# Patient Record
Sex: MALE | Race: WHITE
[De-identification: names, ages, dates, MRNs, and addresses within clinical notes are randomized per-mention and may not be internally consistent; named-entity substitution may affect disease eponyms.]

---

## 2017-01-18 ENCOUNTER — HOSPITAL ENCOUNTER (EMERGENCY)
Dept: HOSPITAL 17 - NETRI | Age: 62
Discharge: HOME | End: 2017-01-18
Payer: SELF-PAY

## 2017-01-18 VITALS
RESPIRATION RATE: 20 BRPM | DIASTOLIC BLOOD PRESSURE: 84 MMHG | HEART RATE: 54 BPM | SYSTOLIC BLOOD PRESSURE: 131 MMHG | TEMPERATURE: 97.7 F | OXYGEN SATURATION: 95 %

## 2017-01-18 VITALS — BODY MASS INDEX: 21.08 KG/M2 | WEIGHT: 142.31 LBS | HEIGHT: 69 IN

## 2017-01-18 DIAGNOSIS — I10: ICD-10-CM

## 2017-01-18 DIAGNOSIS — J45.998: ICD-10-CM

## 2017-01-18 DIAGNOSIS — G47.30: ICD-10-CM

## 2017-01-18 DIAGNOSIS — Z59.0: ICD-10-CM

## 2017-01-18 DIAGNOSIS — J44.1: Primary | ICD-10-CM

## 2017-01-18 LAB
ANION GAP SERPL CALC-SCNC: 6 MEQ/L (ref 5–15)
APTT BLD: 27.5 SEC (ref 24.3–30.1)
BASOPHILS # BLD AUTO: 0 TH/MM3 (ref 0–0.2)
BASOPHILS NFR BLD: 0.5 % (ref 0–2)
BUN SERPL-MCNC: 8 MG/DL (ref 7–18)
CHLORIDE SERPL-SCNC: 105 MEQ/L (ref 98–107)
CK SERPL-CCNC: 56 U/L (ref 39–308)
EOSINOPHIL # BLD: 0.1 TH/MM3 (ref 0–0.4)
EOSINOPHIL NFR BLD: 2.1 % (ref 0–4)
ERYTHROCYTE [DISTWIDTH] IN BLOOD BY AUTOMATED COUNT: 15.2 % (ref 11.6–17.2)
GFR SERPLBLD BASED ON 1.73 SQ M-ARVRAT: 84 ML/MIN (ref 89–?)
HCO3 BLD-SCNC: 27.2 MEQ/L (ref 21–32)
HCT VFR BLD CALC: 41.3 % (ref 39–51)
HEMO FLAGS: (no result)
INR PPP: 1 RATIO
LYMPHOCYTES # BLD AUTO: 2 TH/MM3 (ref 1–4.8)
LYMPHOCYTES NFR BLD AUTO: 40.6 % (ref 9–44)
MCH RBC QN AUTO: 29.6 PG (ref 27–34)
MCHC RBC AUTO-ENTMCNC: 33.4 % (ref 32–36)
MCV RBC AUTO: 88.5 FL (ref 80–100)
MONOCYTES NFR BLD: 8.8 % (ref 0–8)
NEUTROPHILS # BLD AUTO: 2.4 TH/MM3 (ref 1.8–7.7)
NEUTROPHILS NFR BLD AUTO: 48 % (ref 16–70)
PLATELET # BLD: 226 TH/MM3 (ref 150–450)
POTASSIUM SERPL-SCNC: 3.7 MEQ/L (ref 3.5–5.1)
PROTHROMBIN TIME: 10.5 SEC (ref 9.8–11.6)
RBC # BLD AUTO: 4.67 MIL/MM3 (ref 4.5–5.9)
SODIUM SERPL-SCNC: 138 MEQ/L (ref 136–145)
WBC # BLD AUTO: 5 TH/MM3 (ref 4–11)

## 2017-01-18 PROCEDURE — 71010: CPT

## 2017-01-18 PROCEDURE — 84484 ASSAY OF TROPONIN QUANT: CPT

## 2017-01-18 PROCEDURE — 85025 COMPLETE CBC W/AUTO DIFF WBC: CPT

## 2017-01-18 PROCEDURE — 85610 PROTHROMBIN TIME: CPT

## 2017-01-18 PROCEDURE — 85730 THROMBOPLASTIN TIME PARTIAL: CPT

## 2017-01-18 PROCEDURE — 80048 BASIC METABOLIC PNL TOTAL CA: CPT

## 2017-01-18 PROCEDURE — 93005 ELECTROCARDIOGRAM TRACING: CPT

## 2017-01-18 PROCEDURE — 82550 ASSAY OF CK (CPK): CPT

## 2017-01-18 SDOH — ECONOMIC STABILITY - HOUSING INSECURITY: HOMELESSNESS: Z59.0

## 2017-01-18 NOTE — PD
Physical Exam


Time Seen by Provider:  11:22


Narrative


61 year old male presents to the ED for evaluation of chest tightness that 

began last week when he had a cold. It has been lingering; not getting better, 

not getting worse.  States he wakes up and feels short of breath. No fever or 

chills. Intermittent cough productive of a little greenish yellow.  No history 

of MI. Has hx of COPD, sleep apnea, HTN. Pt is homeless. No other symptoms to 

report. .





Data


Data


Last Documented VS





Vital Signs








  Date Time  Temp Pulse Resp B/P Pulse Ox O2 Delivery O2 Flow Rate FiO2


 


1/18/17 11:18 97.7 54 20 131/84 95 Room Air  








Orders





 Complete Blood Count With Diff (1/18/17 11:24)


Basic Metabolic Panel (Bmp) (1/18/17 11:24)


Act Partial Throm Time (Ptt) (1/18/17 11:24)


Prothrombin Time / Inr (Pt) (1/18/17 11:24)


Ckmb (Isoenzyme) Profile (1/18/17 11:24)


Troponin I (1/18/17 11:24)


Electrocardiogram (1/18/17 11:24)


Chest, Single Ap (1/18/17 )





Labs





 Laboratory Tests








Test 1/18/17





 11:35


 


White Blood Count 5.0 TH/MM3


 


Red Blood Count 4.67 MIL/MM3


 


Hemoglobin 13.8 GM/DL


 


Hematocrit 41.3 %


 


Mean Corpuscular Volume 88.5 FL


 


Mean Corpuscular Hemoglobin 29.6 PG


 


Mean Corpuscular Hemoglobin 33.4 %





Concent 


 


Red Cell Distribution Width 15.2 %


 


Platelet Count 226 TH/MM3


 


Mean Platelet Volume 8.5 FL


 


Neutrophils (%) (Auto) 48.0 %


 


Lymphocytes (%) (Auto) 40.6 %


 


Monocytes (%) (Auto) 8.8 %


 


Eosinophils (%) (Auto) 2.1 %


 


Basophils (%) (Auto) 0.5 %


 


Neutrophils # (Auto) 2.4 TH/MM3


 


Lymphocytes # (Auto) 2.0 TH/MM3


 


Monocytes # (Auto) 0.4 TH/MM3


 


Eosinophils # (Auto) 0.1 TH/MM3


 


Basophils # (Auto) 0.0 TH/MM3


 


CBC Comment DIFF FINAL 


 


Differential Comment  


 


Prothrombin Time 10.5 SEC


 


Prothromb Time International 1.0 RATIO





Ratio 


 


Activated Partial 27.5 SEC





Thromboplast Time 


 


Sodium Level 138 MEQ/L


 


Potassium Level 3.7 MEQ/L


 


Chloride Level 105 MEQ/L


 


Carbon Dioxide Level 27.2 MEQ/L


 


Anion Gap 6 MEQ/L


 


Blood Urea Nitrogen 8 MG/DL


 


Creatinine 0.91 MG/DL


 


Estimat Glomerular Filtration 84 ML/MIN





Rate 


 


Random Glucose 131 MG/DL


 


Calcium Level 9.3 MG/DL


 


Total Creatine Kinase 56 U/L


 


Troponin I LESS THAN 0.02





 NG/ML











MDM


Medical Record Reviewed:  Yes


Supervised Visit with JIGAR:  No


Narrative Course


Work up initiated in triage. Pt is aware if he leaves prior to disposition it 

would be against medical advice.  This could result in the following, not 

limited to: worsening of his symptoms, sepsis, or death





1308 Called pt to discuss results and disposition; he is not in waiting area. 

He has left AMA


Diagnosis





 Primary Impression:  


 COPD exacerbation


Referrals:  


Primary Care Physician


Patient Instructions:  COPD (Chronic Obstructive Pulmonary Disease) (ED), 

General Instructions





***Additional Instruction:


Stop smoking


Follow up with your primary care provider


Return immediately with any acute worsening of symptoms


***Med/Other Pt SpecificInfo:  Prescription(s) given


Scripts


Prednisone 50 Mg Tab50 Mg PO DAILY  5 Days  Ref 0


   Prov:Sarah Brito         1/18/17 


Albuterol 8.5 GM Inh (Proair Hfa 8.5 GM Inh)90 Mcg/Act Aer2 Puff INH Q4H PRN (

SHORTNESS OF BREATH) #1 INHALER  Ref 0


   108 mcg/actuation


   Prov:Sarah Brito         1/18/17 


Azithromycin (Zithromax Z-José)250 Mg [id number] Mg PO AS DIRECTED  #1 DSPK  Ref 0


   500 MG (2 tabs) day 1, then 1 tab days 2-5.


   Prov:Sarah Brito         1/18/17


Disposition:  01 DISCHARGE HOME


Condition:  Stable








Sarah Brito Jan 18, 2017 11:24

## 2017-01-18 NOTE — RADRPT
EXAM DATE/TIME:  01/18/2017 11:47 

 

[facility] COMPARISON:     

CHEST SINGLE AP, April 23, 2016, 7:28.

 

                     

INDICATIONS :     

Short of breath.

                     

 

MEDICAL HISTORY :     

None.          

 

SURGICAL HISTORY :     

None.   

 

ENCOUNTER:     

Initial                                        

 

ACUITY:     

1 week      

 

PAIN SCORE:     

1/10

 

LOCATION:     

Right chest 

 

FINDINGS:     

A single view of the chest demonstrates the lungs to be symmetrically aerated without evidence of mas
s, infiltrate or effusion. The lungs appear to be chronically hyperinflated. The cardiomediastinal co
ntours are unremarkable.  Osseous structures are intact.

 

CONCLUSION:     Findings consistent with COPD. No acute abnormality. 

 

 

 Aurora Banerjee MD on January 18, 2017 at 12:15           

Board Certified Radiologist.

 This report was verified electronically.

## 2017-01-19 NOTE — EKG
Date Performed: 01/18/2017       Time Performed: 11:50:03

 

PTAGE:      61 years

 

EKG:      Sinus rhythm 

 

 INDETERMINATE AXIS ABNORMAL ECG

 

PREVIOUS TRACING       : 04/24/2016 20.14

 

DOCTOR:   Reji Garza  Interpretating Date/Time  01/19/2017 21:12:49

## 2017-04-23 ENCOUNTER — HOSPITAL ENCOUNTER (EMERGENCY)
Dept: HOSPITAL 17 - NEPK | Age: 62
Discharge: HOME | End: 2017-04-23
Payer: MEDICAID

## 2017-04-23 VITALS — HEIGHT: 69 IN | BODY MASS INDEX: 20.9 KG/M2 | WEIGHT: 141.1 LBS

## 2017-04-23 VITALS
HEART RATE: 60 BPM | RESPIRATION RATE: 16 BRPM | DIASTOLIC BLOOD PRESSURE: 94 MMHG | SYSTOLIC BLOOD PRESSURE: 148 MMHG | TEMPERATURE: 97 F | OXYGEN SATURATION: 97 %

## 2017-04-23 DIAGNOSIS — Z72.0: ICD-10-CM
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
## 2017-04-23 DIAGNOSIS — S69.91XA: Primary | ICD-10-CM

## 2017-04-23 DIAGNOSIS — F12.90: ICD-10-CM

## 2017-04-23 DIAGNOSIS — I10: ICD-10-CM

## 2017-04-23 PROCEDURE — 99283 EMERGENCY DEPT VISIT LOW MDM: CPT

## 2017-04-23 PROCEDURE — 73130 X-RAY EXAM OF HAND: CPT

## 2017-04-23 NOTE — RADRPT
EXAM DATE/TIME:  04/23/2017 12:52 

 

[facility] COMPARISON:     

No previous studies available for comparison.

 

                     

INDICATIONS :     

Right hand pain that radiates from first and second digits.

                     

 

MEDICAL HISTORY :     

None.          

 

SURGICAL HISTORY :     

None.   

 

ENCOUNTER:     

Initial                                        

 

ACUITY:     

4 - 6 days      

 

PAIN SCORE:     

10/10

 

LOCATION:     

Right hand

 

FINDINGS:     

No acute fracture is seen.  There is very prominent hypertrophic change seen at the first carpometaca
rpal joint.  There is remodeling at the trapezium.  There is prominent hypertrophic seen around the b
ase of the first metacarpal.  There is some joint space narrowing at this region.  The remaining bone
s and joints appear normally aligned.  

 

CONCLUSION:     

 

Very prominent degenerative change at the first carpometacarpal joint. 

 

 Asad Moore MD on April 23, 2017 at 13:18                

Board Certified Radiologist.

 This report was verified electronically.

## 2017-04-23 NOTE — PD
HPI


Chief Complaint:  Injury


Time Seen by Provider:  12:29


Travel History


International Travel<30 days:  No


Contact w/Intl Traveler<30days:  No


Traveled to known affect area:  No





History of Present Illness


HPI


61-year-old male presents to emergency Department with complaint of right hand 

pain since Wednesday.  He laid down his scooter and injured his hand.  He 

denies hitting his head or loss of consciousness.  Denies neck pain or back 

pain.  Denies chest pain, shortness breath, abdominal pain, nausea, vomiting.  

Denies anticoagulants.  Denies other extremity pain.  Denies paresthesias, loss 

of sensation to the affected extremity.  Reports decreased range of motion 

secondary to pain and swelling.  Patient is taking tramadol and Percocet for 

pain with good relief.  He took 2 tramadol this morning prior to arrival to the 

ER.  No other medical complaints.  No other modifying factors or associated 

signs and symptoms.





PFSH


Past Medical History


Arthritis:  Yes


Asthma:  Yes


Blood Disorders:  No


Anxiety:  Yes


Depression:  Yes


Heart Rhythm Problems:  No


Cancer:  No


Cardiovascular Problems:  No


High Cholesterol:  Yes


Chest Pain:  Yes


Congestive Heart Failure:  No


COPD:  Yes


Cerebrovascular Accident:  No


Diabetes:  No


Endocrine:  No


Gastrointestinal Disorders:  Yes (POLYPS REMOVED, HEPATITIS)


Genitourinary:  No


Headaches:  Yes


Hypertension:  Yes


Immune Disorder:  No


Implanted Vascular Access Dvce:  No


Musculoskeletal:  Yes


Neurologic:  Yes


Psychiatric:  No


Reproductive:  No


Respiratory:  Yes (COPD)


Migraines:  Yes


Myocardial Infarction:  No


Sleep Apnea:  Yes


Thyroid Disease:  No





Past Surgical History


Other Surgery:  Yes (SCOPE RIGHT KNEE)





Family History


Family Hypercholesterolemia:  Yes





Social History


Alcohol Use:  No (NONE 2/09.)


Tobacco Use:  Yes (4 CIGS A DAY )


Substance Use:  Yes ( WEED )





Allergies-Medications


(Allergen,Severity, Reaction):  


Coded Allergies:  


     *MDRO Multi-Drug Resistant Organism (Verified  Adverse Reaction, Unknown, 4 /23/17)


 MRSA PCR (nares) positive - 4/23/16


Reported Meds & Prescriptions





Reported Meds & Active Scripts


Active


Ibuprofen 800 Mg Tab 800 Mg PO Q8HR PRN








Review of Systems


Except as stated in HPI:  all other systems reviewed are Neg





Physical Exam


Narrative


GENERAL: Well-nourished, well-developed  male patient, in no acute 

distress


SKIN: Warm and dry.


HEAD: Atraumatic. Normocephalic. 


EYES: Pupils equal and round. No scleral icterus. No injection or drainage.


ENT: Mucosa pink and moist.  Airway patent.  


NECK: Trachea midline.  


CARDIOVASCULAR: Regular rate.  


RESPIRATORY: No accessory muscle use.


GASTROINTESTINAL: Flat.


MUSCULOSKELETAL: Right hand is mildly edematous at the first and second 

metacarpal area and with tenderness on palpation; no obvious deformity; without 

erythema, edema, ecchymosis; fingers with full range of motion and sensory 

intact.  Rate approximately supple and non-tense with 2+ radial pulse and 

sensory intact.  No obvious deformities. No clubbing.  No cyanosis. 


NEUROLOGICAL: Awake and alert.  Oriented 3.  No obvious cranial nerve 

deficits.  Motor grossly within normal limits. Normal speech. 


PSYCHIATRIC: Appropriate mood and affect; insight and judgment normal.





Data


Data


Last Documented VS





Vital Signs








  Date Time  Temp Pulse Resp B/P Pulse Ox O2 Delivery O2 Flow Rate FiO2


 


4/23/17 12:23 97.0 60 16 148/94 97 Room Air  








Orders





 Hand, Complete (May3qbv) (4/23/17 12:34)


Ice/Cold Pack (4/23/17 12:34)


Splint Or Brace Apply/Monitor (4/23/17 13:58)








MDM


Medical Decision Making


Medical Screen Exam Complete:  Yes


Emergency Medical Condition:  Yes


Medical Record Reviewed:  Yes


Differential Diagnosis


Hand fracture, hand sprain, hand contusion


Narrative Course


61-year-old male with right hand injury after laying down his scooter on 

Wednesday.  He denies hitting his head or loss of consciousness.  Denies neck 

pain or back pain.  I offered the patient a nonnarcotic for pain and he 

declined.  Right hand x-ray ordered.


1359:  Right hand x-ray with no acute findings.  A copy of the x-ray report was 

provided for the patient for degenerative findings.  Ace bandage applied for 

compression and support.  Ibuprofen prescribed for home.  Patient verbalizes 

understanding and agreement with treatment plan.  Patient is medically cleared 

and stable for discharge.  Discussed reasons to return to the emergency 

department.  Instructed patient to follow up with primary care provider.  

Patient agrees with treatment plan.  The patients vital signs are stable and 

the patient is stable for outpatient follow-up and treatment.  Patient 

discharged home, stable and in no acute distress.





Diagnosis





 Primary Impression:  


 Injury of right hand


 Qualified Code:  S69.91XA - Injury of right hand, initial encounter


Referrals:  


Primary Care Physician


Patient Instructions:  General Instructions, Hand Sprain (ED)


Departure Forms:  Tests/Procedures, Work Release   Enter return to work date:  

May 1, 2017





***Additional Instructions:


Tylenol or ibuprofen as directed and as needed to reduce pain


Rest, ice, compress, and elevate extremity to decrease pain and inflammation


Ace wrap for compression and support


Avoid aggravating activity; increase activity as tolerated


Follow-up with primary care provider


Return to the emergency department immediately with worsening symptoms


***Med/Other Pt SpecificInfo:  Prescription(s) given


Scripts


Ibuprofen 800 Mg Qxi854 Mg PO Q8HR PRN (PAIN) #30 TAB  Ref 0


   Prov:Zeenat Pineda         4/23/17


Disposition:  01 DISCHARGE HOME


Condition:  Stable








Zeenat Pineda Apr 23, 2017 12:29

## 2017-08-18 ENCOUNTER — HOSPITAL ENCOUNTER (EMERGENCY)
Dept: HOSPITAL 17 - NED | Age: 62
Discharge: LEFT BEFORE DISCHARGE | End: 2017-08-18
Payer: SELF-PAY

## 2017-08-18 VITALS — BODY MASS INDEX: 20.16 KG/M2 | WEIGHT: 136.14 LBS | HEIGHT: 69 IN

## 2017-08-18 VITALS
HEART RATE: 89 BPM | TEMPERATURE: 98.2 F | RESPIRATION RATE: 16 BRPM | OXYGEN SATURATION: 99 % | SYSTOLIC BLOOD PRESSURE: 159 MMHG | DIASTOLIC BLOOD PRESSURE: 90 MMHG

## 2017-08-18 DIAGNOSIS — L98.9: Primary | ICD-10-CM

## 2017-08-18 PROCEDURE — 99281 EMR DPT VST MAYX REQ PHY/QHP: CPT

## 2017-08-19 ENCOUNTER — HOSPITAL ENCOUNTER (EMERGENCY)
Dept: HOSPITAL 17 - NEPD | Age: 62
Discharge: LEFT BEFORE DISCHARGE | End: 2017-08-19
Payer: SELF-PAY

## 2017-08-19 VITALS
TEMPERATURE: 98.6 F | SYSTOLIC BLOOD PRESSURE: 155 MMHG | OXYGEN SATURATION: 98 % | RESPIRATION RATE: 15 BRPM | DIASTOLIC BLOOD PRESSURE: 113 MMHG | HEART RATE: 101 BPM

## 2017-08-19 VITALS — WEIGHT: 132.28 LBS | HEIGHT: 69 IN | BODY MASS INDEX: 19.59 KG/M2

## 2017-08-19 DIAGNOSIS — S80.811A: ICD-10-CM

## 2017-08-19 DIAGNOSIS — S80.812A: Primary | ICD-10-CM
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
## 2017-08-19 PROCEDURE — 99281 EMR DPT VST MAYX REQ PHY/QHP: CPT

## 2017-08-19 NOTE — PD
HPI


.


skin wounds for 3 weeks


Chief Complaint:  Laceration/Skin Injury


Time Seen by Provider:  12:47


Travel History


International Travel<30 days:  No


Contact w/Intl Traveler<30days:  No


Traveled to known affect area:  No





History of Present Illness


HPI


62-year-old male here with complaints of skin wounds that he's had for 3 weeks.

  Patient says that one time they were draining pus.  He would like to have 

them checked out.  He is concerned about staph.  He denies any fever or chills.

  He has no other complaints.





PFSH


Past Medical History


Arthritis:  Yes


Asthma:  Yes


Blood Disorders:  No


Bipolar Disorder:  Yes


Anxiety:  Yes


Depression:  Yes


Heart Rhythm Problems:  No


Cancer:  No


Cardiovascular Problems:  No


High Cholesterol:  Yes


Chest Pain:  Yes


Congestive Heart Failure:  No


COPD:  Yes


Cerebrovascular Accident:  No


Diabetes:  No


Endocrine:  No


Gastrointestinal Disorders:  Yes (POLYPS REMOVED, HEPATITIS)


Genitourinary:  No


Headaches:  Yes


Hepatitis:  Yes


Hypertension:  Yes


Immune Disorder:  No


Implanted Vascular Access Dvce:  No


Musculoskeletal:  Yes


Neurologic:  Yes


Psychiatric:  No


Reproductive:  No


Respiratory:  Yes (COPD)


Migraines:  Yes


Myocardial Infarction:  No


Sleep Apnea:  Yes


Thyroid Disease:  No





Past Surgical History


Other Surgery:  Yes (SCOPE RIGHT KNEE)





Family History


Family Hypercholesterolemia:  Yes





Social History


Alcohol Use:  No


Tobacco Use:  Yes (4 CIGS A DAY )


Substance Use:  Yes ( WEED )





Allergies-Medications


(Allergen,Severity, Reaction):  


Coded Allergies:  


     *MDRO Multi-Drug Resistant Organism (Verified  Adverse Reaction, Unknown, 4 /23/17)


 MRSA PCR (nares) positive - 4/23/16


Reported Meds & Prescriptions





Reported Meds & Active Scripts


Active


Ibuprofen 800 Mg Tab 800 Mg PO Q8HR PRN








Review of Systems


General / Constitutional:  No: Fever


Eyes:  No: Visual changes


HENT:  No: Headaches


Cardiovascular:  No: Chest Pain or Discomfort


Respiratory:  No: Shortness of Breath


Gastrointestinal:  No: Abdominal Pain


Genitourinary:  No: Dysuria


Musculoskeletal:  No: Pain


Skin:  Positive Other (skin excoriation), No Rash


Neurologic:  No: Weakness


Psychiatric:  No: Depression


Endocrine:  No: Polydipsia


Hematologic/Lymphatic:  No: Easy Bruising





Physical Exam


Narrative


GENERAL: AAO x 3, no acute distress, Well-nourished, well-developed patient.


SKIN: Warm and dry. No visible rashes or bruising. + excoriation to the left 

and right legs, without evidence of infection, no purulence, all scabbed over 


HEAD: Normocephalic and atraumatic.


EYES: No scleral icterus. No injection or drainage. 


ENT: No nasal drainage noted. Mucous membranes pink. Airway patent. 


NECK: Supple, trachea midline. No JVD.


CARDIOVASCULAR: Regular rate and rhythm without murmurs, gallops, or rubs. 


RESPIRATORY: Breath sounds equal bilaterally. No accessory muscle use. No 

rhonchi or rales. 


GASTROINTESTINAL: Abdomen soft, non-tender, nondistended. 


EXTREMITIES: No cyanosis or edema. 


BACK: No obvious deformity. 


NEURO: CN II-12 intact,  strength normal b/l, UE and LE 5/5, no focal 

deficits


PSYCH: AAO x 3, normal affect.





Data


Data


Last Documented VS





Vital Signs








  Date Time  Temp Pulse Resp B/P Pulse Ox O2 Delivery O2 Flow Rate FiO2


 


8/19/17 12:19 98.6 101 15 155/113 98   











MDM


Medical Decision Making


Medical Screen Exam Complete:  Yes


Emergency Medical Condition:  No


Medical Record Reviewed:  Yes


Differential Diagnosis


excoriations, less likely cellulitis, less likely non healing ulcer


Narrative Course


A medical screening exam was performed: 


At the time of evaluation the presenting medical condition was determined not 

to be of an emergent nature. 


The patient was given the option of receiving additional care, but declined. 


Patient was given options for additional community resources from which to 

obtain care.


The Patient Has Been advised to seek medical attention for their presenting 

complaint.


The patient has been advised to return to the ER at any time if an emergent 

condition develops.





NO evidence of infection. 


I advised patient to f/u with his PCP for bp.





Diagnosis





 Primary Impression:  


 Encounter for medical screening examination


Condition:  Stable








Fallon Mendoza Aug 19, 2017 12:47

## 2017-12-12 ENCOUNTER — HOSPITAL ENCOUNTER (EMERGENCY)
Dept: HOSPITAL 17 - NEPD | Age: 62
Discharge: HOME | End: 2017-12-12
Payer: SELF-PAY

## 2017-12-12 VITALS — BODY MASS INDEX: 22.39 KG/M2 | HEIGHT: 68 IN | WEIGHT: 147.71 LBS

## 2017-12-12 VITALS
OXYGEN SATURATION: 99 % | HEART RATE: 96 BPM | TEMPERATURE: 98.2 F | SYSTOLIC BLOOD PRESSURE: 168 MMHG | RESPIRATION RATE: 16 BRPM | DIASTOLIC BLOOD PRESSURE: 98 MMHG

## 2017-12-12 VITALS
DIASTOLIC BLOOD PRESSURE: 97 MMHG | HEART RATE: 99 BPM | SYSTOLIC BLOOD PRESSURE: 160 MMHG | RESPIRATION RATE: 18 BRPM | TEMPERATURE: 100.3 F | OXYGEN SATURATION: 96 %

## 2017-12-12 DIAGNOSIS — R10.9: ICD-10-CM

## 2017-12-12 DIAGNOSIS — J44.9: ICD-10-CM

## 2017-12-12 DIAGNOSIS — E78.00: ICD-10-CM

## 2017-12-12 DIAGNOSIS — F31.9: ICD-10-CM

## 2017-12-12 DIAGNOSIS — I10: ICD-10-CM

## 2017-12-12 DIAGNOSIS — R74.0: Primary | ICD-10-CM

## 2017-12-12 DIAGNOSIS — Z16.24: ICD-10-CM

## 2017-12-12 DIAGNOSIS — F17.210: ICD-10-CM

## 2017-12-12 DIAGNOSIS — Z86.14: ICD-10-CM

## 2017-12-12 LAB
ALP SERPL-CCNC: 98 U/L (ref 45–117)
ALT SERPL-CCNC: 147 U/L (ref 12–78)
ANION GAP SERPL CALC-SCNC: 4 MEQ/L (ref 5–15)
AST SERPL-CCNC: 152 U/L (ref 15–37)
BASOPHILS # BLD AUTO: 0 TH/MM3 (ref 0–0.2)
BASOPHILS NFR BLD: 0.5 % (ref 0–2)
BILIRUB SERPL-MCNC: 0.5 MG/DL (ref 0.2–1)
BUN SERPL-MCNC: 13 MG/DL (ref 7–18)
CHLORIDE SERPL-SCNC: 103 MEQ/L (ref 98–107)
EOSINOPHIL # BLD: 0.3 TH/MM3 (ref 0–0.4)
EOSINOPHIL NFR BLD: 6.3 % (ref 0–4)
ERYTHROCYTE [DISTWIDTH] IN BLOOD BY AUTOMATED COUNT: 14.6 % (ref 11.6–17.2)
GFR SERPLBLD BASED ON 1.73 SQ M-ARVRAT: 76 ML/MIN (ref 89–?)
HCO3 BLD-SCNC: 28.8 MEQ/L (ref 21–32)
HCT VFR BLD CALC: 42.2 % (ref 39–51)
HEMO FLAGS: (no result)
LYMPHOCYTES # BLD AUTO: 0.4 TH/MM3 (ref 1–4.8)
LYMPHOCYTES NFR BLD AUTO: 8.9 % (ref 9–44)
MCH RBC QN AUTO: 31 PG (ref 27–34)
MCHC RBC AUTO-ENTMCNC: 34.5 % (ref 32–36)
MCV RBC AUTO: 89.7 FL (ref 80–100)
MONOCYTES NFR BLD: 12 % (ref 0–8)
NEUTROPHILS # BLD AUTO: 3.2 TH/MM3 (ref 1.8–7.7)
NEUTROPHILS NFR BLD AUTO: 72.3 % (ref 16–70)
PLATELET # BLD: 171 TH/MM3 (ref 150–450)
POTASSIUM SERPL-SCNC: 3.9 MEQ/L (ref 3.5–5.1)
RBC # BLD AUTO: 4.7 MIL/MM3 (ref 4.5–5.9)
SODIUM SERPL-SCNC: 136 MEQ/L (ref 136–145)
WBC # BLD AUTO: 4.4 TH/MM3 (ref 4–11)

## 2017-12-12 PROCEDURE — 99284 EMERGENCY DEPT VISIT MOD MDM: CPT

## 2017-12-12 PROCEDURE — 96372 THER/PROPH/DIAG INJ SC/IM: CPT

## 2017-12-12 PROCEDURE — 96374 THER/PROPH/DIAG INJ IV PUSH: CPT

## 2017-12-12 PROCEDURE — 80053 COMPREHEN METABOLIC PANEL: CPT

## 2017-12-12 PROCEDURE — 83690 ASSAY OF LIPASE: CPT

## 2017-12-12 PROCEDURE — 85025 COMPLETE CBC W/AUTO DIFF WBC: CPT

## 2018-04-16 ENCOUNTER — HOSPITAL ENCOUNTER (EMERGENCY)
Dept: HOSPITAL 17 - NEPC | Age: 63
Discharge: HOME | End: 2018-04-16
Payer: MEDICAID

## 2018-04-16 VITALS — BODY MASS INDEX: 19.59 KG/M2 | HEIGHT: 69 IN | WEIGHT: 132.28 LBS

## 2018-04-16 VITALS
SYSTOLIC BLOOD PRESSURE: 144 MMHG | DIASTOLIC BLOOD PRESSURE: 88 MMHG | RESPIRATION RATE: 18 BRPM | HEART RATE: 64 BPM | OXYGEN SATURATION: 97 %

## 2018-04-16 VITALS — OXYGEN SATURATION: 97 %

## 2018-04-16 VITALS
SYSTOLIC BLOOD PRESSURE: 181 MMHG | OXYGEN SATURATION: 96 % | RESPIRATION RATE: 18 BRPM | DIASTOLIC BLOOD PRESSURE: 105 MMHG | HEART RATE: 76 BPM

## 2018-04-16 DIAGNOSIS — F41.9: ICD-10-CM

## 2018-04-16 DIAGNOSIS — L02.01: Primary | ICD-10-CM

## 2018-04-16 DIAGNOSIS — B95.62: ICD-10-CM

## 2018-04-16 DIAGNOSIS — R07.9: ICD-10-CM

## 2018-04-16 DIAGNOSIS — I10: ICD-10-CM

## 2018-04-16 DIAGNOSIS — Z72.0: ICD-10-CM

## 2018-04-16 DIAGNOSIS — J44.9: ICD-10-CM

## 2018-04-16 DIAGNOSIS — Z88.2: ICD-10-CM

## 2018-04-16 DIAGNOSIS — R94.31: ICD-10-CM

## 2018-04-16 LAB
BASOPHILS # BLD AUTO: 0 TH/MM3 (ref 0–0.2)
BASOPHILS NFR BLD: 0.4 % (ref 0–2)
BUN SERPL-MCNC: 12 MG/DL (ref 7–18)
CALCIUM SERPL-MCNC: 10.1 MG/DL (ref 8.5–10.1)
CHLORIDE SERPL-SCNC: 103 MEQ/L (ref 98–107)
CREAT SERPL-MCNC: 0.98 MG/DL (ref 0.6–1.3)
EOSINOPHIL # BLD: 0.1 TH/MM3 (ref 0–0.4)
EOSINOPHIL NFR BLD: 1.4 % (ref 0–4)
ERYTHROCYTE [DISTWIDTH] IN BLOOD BY AUTOMATED COUNT: 14.5 % (ref 11.6–17.2)
GFR SERPLBLD BASED ON 1.73 SQ M-ARVRAT: 77 ML/MIN (ref 89–?)
GLUCOSE SERPL-MCNC: 87 MG/DL (ref 74–106)
HCO3 BLD-SCNC: 30.1 MEQ/L (ref 21–32)
HCT VFR BLD CALC: 43.8 % (ref 39–51)
HGB BLD-MCNC: 14.9 GM/DL (ref 13–17)
INR PPP: 1 RATIO
LYMPHOCYTES # BLD AUTO: 1.4 TH/MM3 (ref 1–4.8)
LYMPHOCYTES NFR BLD AUTO: 18.1 % (ref 9–44)
MAGNESIUM SERPL-MCNC: 2.1 MG/DL (ref 1.5–2.5)
MCH RBC QN AUTO: 30.5 PG (ref 27–34)
MCHC RBC AUTO-ENTMCNC: 34.2 % (ref 32–36)
MCV RBC AUTO: 89.4 FL (ref 80–100)
MONOCYTE #: 0.7 TH/MM3 (ref 0–0.9)
MONOCYTES NFR BLD: 9.2 % (ref 0–8)
NEUTROPHILS # BLD AUTO: 5.4 TH/MM3 (ref 1.8–7.7)
NEUTROPHILS NFR BLD AUTO: 70.9 % (ref 16–70)
PLATELET # BLD: 251 TH/MM3 (ref 150–450)
PMV BLD AUTO: 9 FL (ref 7–11)
PROTHROMBIN TIME: 9.9 SEC (ref 9.8–11.6)
RBC # BLD AUTO: 4.9 MIL/MM3 (ref 4.5–5.9)
SODIUM SERPL-SCNC: 138 MEQ/L (ref 136–145)
TROPONIN I SERPL-MCNC: (no result) NG/ML (ref 0.02–0.05)
WBC # BLD AUTO: 7.6 TH/MM3 (ref 4–11)

## 2018-04-16 PROCEDURE — 80048 BASIC METABOLIC PNL TOTAL CA: CPT

## 2018-04-16 PROCEDURE — 96374 THER/PROPH/DIAG INJ IV PUSH: CPT

## 2018-04-16 PROCEDURE — 87186 SC STD MICRODIL/AGAR DIL: CPT

## 2018-04-16 PROCEDURE — 82550 ASSAY OF CK (CPK): CPT

## 2018-04-16 PROCEDURE — 83735 ASSAY OF MAGNESIUM: CPT

## 2018-04-16 PROCEDURE — 85025 COMPLETE CBC W/AUTO DIFF WBC: CPT

## 2018-04-16 PROCEDURE — 10060 I&D ABSCESS SIMPLE/SINGLE: CPT

## 2018-04-16 PROCEDURE — 93005 ELECTROCARDIOGRAM TRACING: CPT

## 2018-04-16 PROCEDURE — 84484 ASSAY OF TROPONIN QUANT: CPT

## 2018-04-16 PROCEDURE — 87070 CULTURE OTHR SPECIMN AEROBIC: CPT

## 2018-04-16 PROCEDURE — 86403 PARTICLE AGGLUT ANTBDY SCRN: CPT

## 2018-04-16 PROCEDURE — 85610 PROTHROMBIN TIME: CPT

## 2018-04-16 PROCEDURE — 85730 THROMBOPLASTIN TIME PARTIAL: CPT

## 2018-04-16 PROCEDURE — 71045 X-RAY EXAM CHEST 1 VIEW: CPT

## 2018-04-16 NOTE — RADRPT
EXAM DATE/TIME:  04/16/2018 12:11 

 

[facility] COMPARISON:     

CHEST SINGLE AP, January 18, 2017, 11:47.

 

                     

INDICATIONS :     

Chest pain.

                     

 

MEDICAL HISTORY :     

None.          

 

SURGICAL HISTORY :     

None.   

 

ENCOUNTER:     

Initial                                        

 

ACUITY:     

3 days      

 

PAIN SCORE:     

2/10

 

LOCATION:     

Bilateral chest 

 

FINDINGS:     

A single view of the chest demonstrates the lungs to be symmetrically aerated without evidence of mas
s, infiltrate or effusion.  The cardiomediastinal contours are unremarkable.  Osseous structures are 
intact.

 

CONCLUSION:     No acute disease.  

 

 

 

 Donald Presley MD FACR on April 16, 2018 at 12:59           

Board Certified Radiologist.

 This report was verified electronically.

## 2018-04-16 NOTE — PD
HPI


Chief Complaint:  Wound/Suture/Staple Re-Check


Time Seen by Provider:  11:50


Travel History


International Travel<30 days:  No


Contact w/Intl Traveler<30days:  No


Traveled to known affect area:  No





History of Present Illness


HPI


62-year-old male presents via EMS for evaluation.  One week ago he developed a 

pimple type lesion on his right chin.  Since then and has gradually gotten 

larger.  Reports pain associated which is throbbing and worse with palpation.  

He also reports mild chest pain which she describes as sharp and intermittent.  

He reports that he has had similar pain on an almost daily basis ever since he 

was a child and he related to anxiety.  He reports that he is feeling anxious 

because of the lesion on his face.  He received aspirin via EMS.  He denies 

shortness of breath, cough, congestion, nausea or vomiting, diaphoresis, fevers 

or chills.  He has no other complaints at this time.





PFSH


Past Medical History


Arthritis:  Yes


Asthma:  Yes


Blood Disorders:  No


Bipolar Disorder:  Yes


Anxiety:  Yes


Depression:  Yes


Heart Rhythm Problems:  No


Cancer:  No


Cardiovascular Problems:  No


High Cholesterol:  Yes


Chest Pain:  Yes


Congestive Heart Failure:  No


COPD:  Yes


Cerebrovascular Accident:  No


Diabetes:  No


Diminished Hearing:  No


Endocrine:  No


Gastrointestinal Disorders:  Yes (POLYPS REMOVED, HEPATITIS)


Genitourinary:  No


Headaches:  Yes


Hepatitis:  Yes


Hypertension:  Yes


Immune Disorder:  No


Implanted Vascular Access Dvce:  No


Musculoskeletal:  Yes


Neurologic:  Yes


Psychiatric:  No


Reproductive:  No


Respiratory:  Yes (COPD)


Migraines:  Yes


Myocardial Infarction:  No


Sleep Apnea:  Yes


Thyroid Disease:  No


Tetanus Vaccination:  Unknown


Influenza Vaccination:  Yes


Pregnant?:  Not Pregnant





Past Surgical History


Other Surgery:  Yes (SCOPE RIGHT KNEE)





Family History


Family Hypercholesterolemia:  Yes





Social History


Alcohol Use:  No


Tobacco Use:  Yes (4 CIGS A DAY )


Substance Use:  Yes (marijuana)





Allergies-Medications


(Allergen,Severity, Reaction):  


Coded Allergies:  


     *MDRO Multi-Drug Resistant Organism (Verified  Adverse Reaction, Unknown, 4 /23/17)


 MRSA PCR (nares) positive - 4/23/16


Reported Meds & Prescriptions





Reported Meds & Active Scripts


Active


Keflex (Cephalexin) 500 Mg Cap 500 Mg PO Q8H


Bactrim DS (Sulfamethoxazole-Trimethoprim) 800-160 Mg Tab 1 Tab PO BID


Azithromycin 250 Mg Tab 250 Mg PO AS DIRECTED


     Take 2 tabs (500 mg) on day 1 then 1 tab daily x 4 days.








Review of Systems


Except as stated in HPI:  all other systems reviewed are Neg





Physical Exam


Narrative


GENERAL: Well-developed well-nourished male in no acute distress.


SKIN: Warm and dry.  2 cm fluctuant abscess right chin.


HEAD: Atraumatic. Normocephalic. 


EYES: Pupils equal and round. No scleral icterus. No injection or drainage. 


ENT: No nasal bleeding or discharge.  Mucous membranes pink and moist.


NECK: Trachea midline. No JVD. 


CARDIOVASCULAR: Regular rate and rhythm.  No murmur appreciated.


RESPIRATORY: No accessory muscle use. Clear to auscultation. Breath sounds 

equal bilaterally.  No crackles no wheezing no rhonchi


GASTROINTESTINAL: Abdomen soft, non-tender, nondistended. Hepatic and splenic 

margins not palpable. 


MUSCULOSKELETAL: No obvious deformities.  No edema.


NEUROLOGICAL: Awake and alert. No obvious cranial nerve deficits.  Motor 

grossly within normal limits. Normal speech.


PSYCHIATRIC: Appropriate mood and affect; insight and judgment normal.





Data


Data


Last Documented VS





Vital Signs








  Date Time  Temp Pulse Resp B/P (MAP) Pulse Ox O2 Delivery O2 Flow Rate FiO2


 


4/16/18 12:45  64 18 144/88 (106) 97 Room Air  








Orders





 Orders


Electrocardiogram (4/16/18 11:56)


Basic Metabolic Panel (Bmp) (4/16/18 11:56)


Ckmb (Isoenzyme) Profile (4/16/18 11:56)


Complete Blood Count With Diff (4/16/18 11:56)


Magnesium (Mg) (4/16/18 11:56)


Prothrombin Time / Inr (Pt) (4/16/18 11:56)


Act Partial Throm Time (Ptt) (4/16/18 11:56)


Troponin I (4/16/18 11:56)


Chest, Single Ap (4/16/18 11:56)


Ecg Monitoring (4/16/18 11:56)


Bilateral Bp Monitoring (4/16/18 11:56)


Iv Access Insert/Monitor (4/16/18 11:56)


Oximetry (4/16/18 11:56)


Oxygen Administration (4/16/18 11:56)


Aspirin Chew (Aspirin Chew) (4/16/18 12:00)


Sodium Chloride 0.9% Flush (Ns Flush) (4/16/18 12:00)


Labetalol Inj (Trandate Inj) (4/16/18 12:00)


Lidocai-Epi 1%-1:100,000 Inj (Xylocaine- (4/16/18 12:00)


Wound Culture And Gram Stain (4/16/18 11:56)





Labs





Laboratory Tests








Test


  4/16/18


12:00


 


White Blood Count 7.6 TH/MM3 


 


Red Blood Count 4.90 MIL/MM3 


 


Hemoglobin 14.9 GM/DL 


 


Hematocrit 43.8 % 


 


Mean Corpuscular Volume 89.4 FL 


 


Mean Corpuscular Hemoglobin 30.5 PG 


 


Mean Corpuscular Hemoglobin


Concent 34.2 % 


 


 


Red Cell Distribution Width 14.5 % 


 


Platelet Count 251 TH/MM3 


 


Mean Platelet Volume 9.0 FL 


 


Neutrophils (%) (Auto) 70.9 % 


 


Lymphocytes (%) (Auto) 18.1 % 


 


Monocytes (%) (Auto) 9.2 % 


 


Eosinophils (%) (Auto) 1.4 % 


 


Basophils (%) (Auto) 0.4 % 


 


Neutrophils # (Auto) 5.4 TH/MM3 


 


Lymphocytes # (Auto) 1.4 TH/MM3 


 


Monocytes # (Auto) 0.7 TH/MM3 


 


Eosinophils # (Auto) 0.1 TH/MM3 


 


Basophils # (Auto) 0.0 TH/MM3 


 


CBC Comment DIFF FINAL 


 


Differential Comment  


 


Prothrombin Time 9.9 SEC 


 


Prothromb Time International


Ratio 1.0 RATIO 


 


 


Activated Partial


Thromboplast Time 27.2 SEC 


 


 


Blood Urea Nitrogen 12 MG/DL 


 


Creatinine 0.98 MG/DL 


 


Random Glucose 87 MG/DL 


 


Calcium Level 10.1 MG/DL 


 


Magnesium Level 2.1 MG/DL 


 


Sodium Level 138 MEQ/L 


 


Potassium Level 4.0 MEQ/L 


 


Chloride Level 103 MEQ/L 


 


Carbon Dioxide Level 30.1 MEQ/L 


 


Anion Gap 5 MEQ/L 


 


Estimat Glomerular Filtration


Rate 77 ML/MIN 


 


 


Total Creatine Kinase 72 U/L 


 


Troponin I


  LESS THAN 0.02


NG/ML











MDM


Medical Decision Making


Medical Screen Exam Complete:  Yes


Emergency Medical Condition:  Yes


Medical Record Reviewed:  Yes


Differential Diagnosis


Facial abscess, cellulitis, erysipelas, cyst


Narrative Course


The patient has an abscess on his face that will require incision and drainage 

for which he verbally consents.  In regards to his chest pain, by his 

description this has been the pain that he has experienced for most of his life 

on a daily basis and related to anxiety.  His symptoms quickly resolved after 

the incision and drainage and the patient felt much less anxious.  EKG was 

obtained revealing no acute abnormalities.  Cardiac enzymes are negative.  

Chest x-ray is normal.  The patient was monitored here for some time.  He is 

stable for discharge.  He will be placed on Bactrim and Keflex.





Procedures


**Procedure Narrative**


INCISION AND DRAINAGE OF ABSCESS: The area was prepped and was sterilely 

draped.  A subcutaneous wheal of  1% Xylocaine with epinephrine with a total 

number 5 mL was used to anesthetize the area.  The area was properly 

anesthetized.  A number 11 scalpel was used to make a 1-cm incision across the 

area of the abscess.  Cultures were obtained.  The abscess was drained an 

irrigated with normal saline.





Diagnosis





 Primary Impression:  


 Facial abscess


 Additional Impression:  


 Anxiety





***Additional Instructions:  


Medication as prescribed.  Warm compresses several times a day 15 minutes at a 

time.  Return for any emergent medical conditions.


***Med/Other Pt SpecificInfo:  Prescription(s) given


Scripts


Cephalexin (Keflex) 500 Mg Cap


500 MG PO Q8H for Infection, #30 CAP 0 Refills


   Prov: Corky Castro MD         4/16/18 


Sulfamethoxazole-Trimethoprim (Bactrim DS) 800-160 Mg Tab


1 TAB PO BID for Infection, #20 TAB 0 Refills


   Prov: Corky Castro MD         4/16/18


Disposition:  01 DISCHARGE HOME


Condition:  Stable











Anshu Perdue Apr 16, 2018 12:02

## 2018-04-17 NOTE — EKG
Date Performed: 04/16/2018       Time Performed: 12:47:37

 

PTAGE:      62 years

 

EKG:      SINUS BRADYCARDIA Consider anteroseptal myocardial infarction - age indeterminate ABNORMAL 
ECG

 

PREVIOUS TRACING       : 01/18/2017 11.50

 

DOCTOR:   Konstantin Patel  Interpretating Date/Time  04/17/2018 15:48:36